# Patient Record
Sex: FEMALE | Race: ASIAN | Employment: OTHER | URBAN - METROPOLITAN AREA
[De-identification: names, ages, dates, MRNs, and addresses within clinical notes are randomized per-mention and may not be internally consistent; named-entity substitution may affect disease eponyms.]

---

## 2020-07-13 ENCOUNTER — ANCILLARY PROCEDURE (OUTPATIENT)
Dept: GENERAL RADIOLOGY | Facility: CLINIC | Age: 65
End: 2020-07-13
Attending: FAMILY MEDICINE
Payer: MEDICAID

## 2020-07-13 ENCOUNTER — OFFICE VISIT (OUTPATIENT)
Dept: FAMILY MEDICINE | Facility: CLINIC | Age: 65
End: 2020-07-13
Payer: MEDICAID

## 2020-07-13 VITALS
TEMPERATURE: 97.8 F | HEIGHT: 62 IN | DIASTOLIC BLOOD PRESSURE: 82 MMHG | HEART RATE: 60 BPM | OXYGEN SATURATION: 98 % | SYSTOLIC BLOOD PRESSURE: 118 MMHG | WEIGHT: 161 LBS | RESPIRATION RATE: 16 BRPM | BODY MASS INDEX: 29.63 KG/M2

## 2020-07-13 DIAGNOSIS — S62.501A: ICD-10-CM

## 2020-07-13 DIAGNOSIS — S69.91XA INJURY OF RIGHT THUMB, INITIAL ENCOUNTER: Primary | ICD-10-CM

## 2020-07-13 DIAGNOSIS — S69.91XA INJURY OF RIGHT THUMB, INITIAL ENCOUNTER: ICD-10-CM

## 2020-07-13 PROCEDURE — 99203 OFFICE O/P NEW LOW 30 MIN: CPT | Performed by: FAMILY MEDICINE

## 2020-07-13 PROCEDURE — T1013 SIGN LANG/ORAL INTERPRETER: HCPCS | Mod: U3 | Performed by: FAMILY MEDICINE

## 2020-07-13 PROCEDURE — 73140 X-RAY EXAM OF FINGER(S): CPT | Mod: RT

## 2020-07-13 SDOH — HEALTH STABILITY: MENTAL HEALTH: HOW OFTEN DO YOU HAVE A DRINK CONTAINING ALCOHOL?: NEVER

## 2020-07-13 ASSESSMENT — MIFFLIN-ST. JEOR: SCORE: 1238.54

## 2020-07-13 NOTE — PATIENT INSTRUCTIONS
Thank you for choosing [facility].  You may be receiving an email and/or telephone survey request from [facility] Customer Experience regarding your visit today.  Please take a few minutes to respond to the survey to let us know how we are doing.      If you have questions or concerns, please contact us via [employer] or you can contact your care team at [phone].    Our Clinic hours are:  Monday 6:40 am  to 7:00 pm  Tuesday -Friday 6:40 am to 5:00 pm    The [place] outpatient lab hours are:  Monday - Friday 6:10 am to 4:45 pm  Saturdays 7:00 am to 11:00 am  Appointments are required, call [phone]    If you have clinical questions after hours or would like to schedule an appointment,  call the clinic at [phone].

## 2020-07-13 NOTE — PROGRESS NOTES
Subjective     Giorgio Bryan is a 64 year old female who presents to clinic today for the following health issues:    Patient is a 64 yr old female with right thumb injury. She had a drill land on her thumb two weeks ago while she was helping to frame a shed. Since then she has had intense swelling and pain she has been unable to bend the thumb. No bleeding at the time of the injury.     HPI   Concern - injury R thumb   Onset: 2.5 weeks ago     Description:   Patient was helping with framing a shed and the drill fell on her thumb, it is swollen and painful, this happen  about 2.5 weeks ago and has not gotten better     Intensity: moderate constant pain , severe with movement     Progression of Symptoms:  same    Accompanying Signs & Symptoms:  A nail gun or hand drill fell on patient's thumb     Previous history of similar problem:   None     Precipitating factors:   Worsened by: with use     Alleviating factors:  Improved by: none     Therapies Tried and outcome: Patient has tried ice, advil, Ibuprofen and has tried using a splint      There is no problem list on file for this patient.    History reviewed. No pertinent surgical history.    Social History     Tobacco Use     Smoking status: Never Smoker     Smokeless tobacco: Never Used   Substance Use Topics     Alcohol use: Never     Frequency: Never     History reviewed. No pertinent family history.      No current outpatient medications on file.     No Known Allergies  BP Readings from Last 3 Encounters:   07/13/20 118/82    Wt Readings from Last 3 Encounters:   07/13/20 73 kg (161 lb)                      Reviewed and updated as needed this visit by Provider  Tobacco  Allergies  Meds  Problems  Med Hx  Surg Hx  Fam Hx         Review of Systems   Constitutional, HEENT, cardiovascular, pulmonary, gi and gu systems are negative, except as otherwise noted.      Objective    /82   Pulse 60   Temp 97.8  F (36.6  C) (Tympanic)   Resp 16   Ht 1.575 m (5'  "2\")   Wt 73 kg (161 lb)   SpO2 98%   BMI 29.45 kg/m    Body mass index is 29.45 kg/m .  Physical Exam   GENERAL: healthy, alert and no distress  MS: decreased range of motion of right thumb  and tenderness to palpation - swelling    Diagnostic Test Results:  Xray - IMPRESSION: There is a comminuted fracture of the mid diaphysis of the  proximal phalanx of the thumb with up to 2 mm ulnar displacement and  minimal dorsal angulation. Soft tissue swelling about the thumb.        Assessment & Plan       ICD-10-CM    1. Injury of right thumb, initial encounter  S69.91XA XR Finger Right G/E 2 Views   2. Closed avulsion fracture of right thumb, initial encounter  S62.501A Orthopedic & Spine  Referral   Patient referred to orthopedist.    FUTURE APPOINTMENTS:       - Follow-up visit in one month or sooner as needed.    Return in about 4 weeks (around 8/10/2020) for In-Clinic Visit.    Leodan Pulliam MD  [facility]      "

## 2020-07-14 ENCOUNTER — OFFICE VISIT (OUTPATIENT)
Dept: ORTHOPEDICS | Facility: CLINIC | Age: 65
End: 2020-07-14
Attending: FAMILY MEDICINE
Payer: MEDICAID

## 2020-07-14 VITALS — BODY MASS INDEX: 29.63 KG/M2 | WEIGHT: 161 LBS | HEIGHT: 62 IN

## 2020-07-14 DIAGNOSIS — S62.511A DISPLACED FRACTURE OF PROXIMAL PHALANX OF RIGHT THUMB, INITIAL ENCOUNTER FOR CLOSED FRACTURE: Primary | ICD-10-CM

## 2020-07-14 PROCEDURE — 99204 OFFICE O/P NEW MOD 45 MIN: CPT | Mod: 25 | Performed by: FAMILY MEDICINE

## 2020-07-14 PROCEDURE — 29075 APPL CST ELBW FNGR SHORT ARM: CPT | Mod: RT | Performed by: FAMILY MEDICINE

## 2020-07-14 PROCEDURE — T1013 SIGN LANG/ORAL INTERPRETER: HCPCS | Mod: U3 | Performed by: FAMILY MEDICINE

## 2020-07-14 ASSESSMENT — MIFFLIN-ST. JEOR: SCORE: 1238.54

## 2020-07-14 NOTE — PATIENT INSTRUCTIONS
Diagnosis: Right thumb fracture displaced  Image Findings: mildly angulated proximal thumb fracture  Treatment: thumb spica cast, may have to change in 1-2 weeks if it becomes too loose  Medications: Limited tylenol/ibuprofen for pain for 1-2 weeks  Follow-up: 1 week for repeat x-rays in cast    It was great seeing you today!    Dhiraj Dsouza    Patient Education     Fiberglass Cast Care    It may take up to 2 hours for the fiberglass to get completely hard. Don t put any weight on the cast during that time or it may break.  To prevent swelling under the cast, do the following for the first 2 days (48 hours):    If the cast is on your arm: Keep it in a sling or raised to shoulder level when you are sitting or standing. Rest it on your chest or on a pillow at your side when lying down. Keep the cast above the level of your heart.    If the cast is on your leg: Keep it propped up above the level of your hip when you are sitting or lying down. Sleep with the cast raised on pillows. Avoid crutch walking as much as possible during this time.  Keep the cast completely dry at all times. Bathe with your cast well out of the water. Protect it with a large plastic bag kept in place with rubber bands or tape. If your cast does get wet, use a hair dryer to warm the cast and speed up the drying process. A wet cast may cause skin problems.  Don t put creams or objects under the cast if you have itching.  Follow-up care  Follow up with your healthcare provider as advised.  When to seek medical advice  Call your healthcare provider right away if any of these occur:    The cast cracks    The cast and padding get wet and stay wet for more than a day (24 hours)    Bad odor from the cast or wound fluid stains the cast    Tightness or pressure under the cast gets worse    Fingers or toes become swollen, cold, blue, numb, or tingly    You can t move your toes or fingers    Pain under the cast gets worse or you feel a burning  sensation    Skin around the cast becomes red or irritated    Fever of 100.4 F (38 C) or higher, or chills as directed by your healthcare provider   Date Last Reviewed: 2/1/2017 2000-2019 [employer]. [address] [place] [address], [place] [address]. All rights reserved. This information is not intended as a substitute for professional medical care. Always follow your healthcare professional's instructions.

## 2020-07-14 NOTE — LETTER
7/14/2020         RE: Giorgio Bryan  [address]  [facility] [address]        Dear Colleague,    Thank you for referring your patient, Giorgio Bryan, to the [facility]. Please see a copy of my visit note below.    ASSESSMENT & PLAN  Giorgio was seen today for pain.    Diagnoses and all orders for this visit:    Displaced fracture of proximal phalanx of right thumb, initial encounter for closed fracture  -     Cast/splint application    Other orders  -     Orthopedic & Spine  Referral      Patient is a 64 year old female presenting for evaluation of   Chief Complaint   Patient presents with     Right Thumb - Pain     # Displaced right proximal thumb fracture: Noted after getting hit between a drill and a shed 2 and half weeks ago.  Patient still having pain and swelling of the proximal thumb.  X-ray showing a comminuted mildly displaced, dorsally angulated proximal phalanx fracture.  Given this plan to treat as below and follow-up in 1 week for repeat x-rays in cast.  Treatment: Thumb spica cast  Physical Therapy none, if not improved can refer for formal PT  Medications  Limited NSAIDs/Tylenol    Concerning signs/sx that would warrant urgent evaluation were discussed.  All questions were answered, patient understands and agrees with plan.      Return in about 1 week (around 7/21/2020).    -----    SUBJECTIVE  Giorgio Bryan is a/an 64 year old Right handed female who is seen in consultation at the request of  Leodan Pulliam M.D. for evaluation of right hand pain. The patient is seen with their son.    Onset: 2.5 week(s) ago. Patient describes injury as  Patient was helping with framing a shed and the drill fell on her thumb.   Location of Pain: right thumb-deep   Rating of Pain at worst: 7/10  Rating of Pain Currently: 5/10  Worsened by: movement, point tenderness   Better with: nothing   Treatments tried: ice, ibuprofen and splint   Associated symptoms: swelling,  "deep ache, bruising   Orthopedic history: NO  Relevant surgical history: NO  No past medical history on file.  Social History     Socioeconomic History     Marital status:      Spouse name: None     Number of children: None     Years of education: None     Highest education level: None   Occupational History     None   Social Needs     Financial resource strain: None     Food insecurity     Worry: None     Inability: None     Transportation needs     Medical: None     Non-medical: None   Tobacco Use     Smoking status: Never Smoker     Smokeless tobacco: Never Used   Substance and Sexual Activity     Alcohol use: Never     Frequency: Never     Drug use: Never     Sexual activity: Not Currently   Lifestyle     Physical activity     Days per week: None     Minutes per session: None     Stress: None   Relationships     Social connections     Talks on phone: None     Gets together: None     Attends Tenriism service: None     Active member of club or organization: None     Attends meetings of clubs or organizations: None     Relationship status: None     Intimate partner violence     Fear of current or ex partner: None     Emotionally abused: None     Physically abused: None     Forced sexual activity: None   Other Topics Concern     None   Social History Narrative     None     Patient's past medical, surgical, social, and family histories were reviewed today and no changes are noted.  No family history pertinent to the patient's problem today    REVIEW OF SYSTEMS:  10 point ROS is negative other than symptoms noted above in HPI, Past Medical History or as stated below  Constitutional: NEGATIVE for fever, chills, change in weight  Skin: NEGATIVE for worrisome rashes, moles or lesions  GI/: NEGATIVE for bowel or bladder changes  Neuro: NEGATIVE for weakness, dizziness or paresthesias    OBJECTIVE:  BP (P) 112/64   Ht 1.575 m (5' 2\")   Wt 73 kg (161 lb)   BMI 29.45 kg/m     General: healthy, alert and in no " distress  HEENT: no scleral icterus or conjunctival erythema  Skin: no suspicious lesions or rash. No jaundice.  CV: regular rhythm by palpation  Resp: normal respiratory effort without conversational dyspnea   Psych: normal mood and affect  Gait: normal steady gait with appropriate coordination and balance  Neuro: normal light touch sensory exam of the bilateral hands.    MSK:  RIGHT HAND  Inspection:   Right thumb swelling  Palpation:   Carpals: normal   Metacarpals: normal   Thumb: diffuse TTP worse over proximal phalanx   Fingers: normal  Range of Motion:  Unable to flex/extend thumb significantly 2/2 pain, FROM in other digits  Strength:    Pain limiting flex/ext with thumb,  strength intact  Special Tests:    Positive: none    Negative: flexor digitorum superficialis testing, flexor digitorum profundus testing    Independent visualization of the below image:  No results found for this or any previous visit (from the past 24 hour(s)).    Cast/splint application    Date/Time: 7/14/2020 10:26 AM  Performed by: Tran Shaw  Authorized by: Dhiraj Dsouza MD     Consent:     Consent obtained:  Verbal    Consent given by:  Patient    Risks discussed:  Discoloration, numbness, pain and swelling    Alternatives discussed:  Observation and referral  Pre-procedure details:     Sensation:  Normal  Procedure details:     Laterality:  Right    Location:  Finger    Finger:  R thumb    Cast type:  Thumb spica    Supplies:  Fiberglass  Post-procedure details:     Pain:  Improved    Sensation:  Normal    Patient tolerance of procedure:  Tolerated well, no immediate complications    Patient provided with cast or splint care instructions: Yes         Dhiraj Dsouza MD [facility]        Again, thank you for allowing me to participate in the care of your patient.        Sincerely,        Dhiraj Dsouza MD

## 2020-07-14 NOTE — PROGRESS NOTES
ASSESSMENT & PLAN  Giorgio was seen today for pain.    Diagnoses and all orders for this visit:    Displaced fracture of proximal phalanx of right thumb, initial encounter for closed fracture  -     Cast/splint application    Other orders  -     Orthopedic & Spine  Referral      Patient is a 64 year old female presenting for evaluation of   Chief Complaint   Patient presents with     Right Thumb - Pain     # Displaced right proximal thumb fracture: Noted after getting hit between a drill and a shed 2 and half weeks ago.  Patient still having pain and swelling of the proximal thumb.  X-ray showing a comminuted mildly displaced, dorsally angulated proximal phalanx fracture.  Given this plan to treat as below and follow-up in 1 week for repeat x-rays in cast.  Treatment: Thumb spica cast  Physical Therapy none, if not improved can refer for formal PT  Medications  Limited NSAIDs/Tylenol    Concerning signs/sx that would warrant urgent evaluation were discussed.  All questions were answered, patient understands and agrees with plan.      Return in about 1 week (around 7/21/2020).    -----    SUBJECTIVE  Giorgio Bryan is a/an 64 year old Right handed female who is seen in consultation at the request of  Leodan Pulliam M.D. for evaluation of right hand pain. The patient is seen with their son.    Onset: 2.5 week(s) ago. Patient describes injury as  Patient was helping with framing a shed and the drill fell on her thumb.   Location of Pain: right thumb-deep   Rating of Pain at worst: 7/10  Rating of Pain Currently: 5/10  Worsened by: movement, point tenderness   Better with: nothing   Treatments tried: ice, ibuprofen and splint   Associated symptoms: swelling, deep ache, bruising   Orthopedic history: NO  Relevant surgical history: NO  No past medical history on file.  Social History     Socioeconomic History     Marital status:      Spouse name: None     Number of children: None     Years of education:  "None     Highest education level: None   Occupational History     None   Social Needs     Financial resource strain: None     Food insecurity     Worry: None     Inability: None     Transportation needs     Medical: None     Non-medical: None   Tobacco Use     Smoking status: Never Smoker     Smokeless tobacco: Never Used   Substance and Sexual Activity     Alcohol use: Never     Frequency: Never     Drug use: Never     Sexual activity: Not Currently   Lifestyle     Physical activity     Days per week: None     Minutes per session: None     Stress: None   Relationships     Social connections     Talks on phone: None     Gets together: None     Attends Sabianism service: None     Active member of club or organization: None     Attends meetings of clubs or organizations: None     Relationship status: None     Intimate partner violence     Fear of current or ex partner: None     Emotionally abused: None     Physically abused: None     Forced sexual activity: None   Other Topics Concern     None   Social History Narrative     None     Patient's past medical, surgical, social, and family histories were reviewed today and no changes are noted.  No family history pertinent to the patient's problem today    REVIEW OF SYSTEMS:  10 point ROS is negative other than symptoms noted above in HPI, Past Medical History or as stated below  Constitutional: NEGATIVE for fever, chills, change in weight  Skin: NEGATIVE for worrisome rashes, moles or lesions  GI/: NEGATIVE for bowel or bladder changes  Neuro: NEGATIVE for weakness, dizziness or paresthesias    OBJECTIVE:  BP (P) 112/64   Ht 1.575 m (5' 2\")   Wt 73 kg (161 lb)   BMI 29.45 kg/m     General: healthy, alert and in no distress  HEENT: no scleral icterus or conjunctival erythema  Skin: no suspicious lesions or rash. No jaundice.  CV: regular rhythm by palpation  Resp: normal respiratory effort without conversational dyspnea   Psych: normal mood and affect  Gait: normal " steady gait with appropriate coordination and balance  Neuro: normal light touch sensory exam of the bilateral hands.    MSK:  RIGHT HAND  Inspection:   Right thumb swelling  Palpation:   Carpals: normal   Metacarpals: normal   Thumb: diffuse TTP worse over proximal phalanx   Fingers: normal  Range of Motion:  Unable to flex/extend thumb significantly 2/2 pain, FROM in other digits  Strength:    Pain limiting flex/ext with thumb,  strength intact  Special Tests:    Positive: none    Negative: flexor digitorum superficialis testing, flexor digitorum profundus testing    Independent visualization of the below image:  No results found for this or any previous visit (from the past 24 hour(s)).    Cast/splint application    Date/Time: 7/14/2020 10:26 AM  Performed by: Tran Shaw  Authorized by: Dhiraj Dsouza MD     Consent:     Consent obtained:  Verbal    Consent given by:  Patient    Risks discussed:  Discoloration, numbness, pain and swelling    Alternatives discussed:  Observation and referral  Pre-procedure details:     Sensation:  Normal  Procedure details:     Laterality:  Right    Location:  Finger    Finger:  R thumb    Cast type:  Thumb spica    Supplies:  Fiberglass  Post-procedure details:     Pain:  Improved    Sensation:  Normal    Patient tolerance of procedure:  Tolerated well, no immediate complications    Patient provided with cast or splint care instructions: Yes         Dhiraj Dsouza MD [facility]

## 2020-07-24 ENCOUNTER — ANCILLARY PROCEDURE (OUTPATIENT)
Dept: GENERAL RADIOLOGY | Facility: CLINIC | Age: 65
End: 2020-07-24
Attending: FAMILY MEDICINE
Payer: MEDICAID

## 2020-07-24 ENCOUNTER — OFFICE VISIT (OUTPATIENT)
Dept: ORTHOPEDICS | Facility: CLINIC | Age: 65
End: 2020-07-24
Payer: MEDICAID

## 2020-07-24 VITALS — SYSTOLIC BLOOD PRESSURE: 126 MMHG | DIASTOLIC BLOOD PRESSURE: 82 MMHG

## 2020-07-24 DIAGNOSIS — S62.511A DISPLACED FRACTURE OF PROXIMAL PHALANX OF RIGHT THUMB, INITIAL ENCOUNTER FOR CLOSED FRACTURE: ICD-10-CM

## 2020-07-24 DIAGNOSIS — S62.511A DISPLACED FRACTURE OF PROXIMAL PHALANX OF RIGHT THUMB, INITIAL ENCOUNTER FOR CLOSED FRACTURE: Primary | ICD-10-CM

## 2020-07-24 PROCEDURE — T1013 SIGN LANG/ORAL INTERPRETER: HCPCS | Mod: U3 | Performed by: FAMILY MEDICINE

## 2020-07-24 PROCEDURE — 73140 X-RAY EXAM OF FINGER(S): CPT | Mod: RT

## 2020-07-24 PROCEDURE — 99213 OFFICE O/P EST LOW 20 MIN: CPT | Performed by: FAMILY MEDICINE

## 2020-07-24 NOTE — PATIENT INSTRUCTIONS
Diagnosis: Right Thumb Fracture  Image Findings: Stable mildly displaced proximal thumb fracture  Treatment: Continue cast  Medications: Limited tylenol/ibuprofen for pain for 1-2 weeks  Follow-up: 2 weeks for repeat evaluation and x-rays out of cast    It was great seeing you again today!    Dhiraj Dsouza

## 2020-07-24 NOTE — PROGRESS NOTES
ASSESSMENT & PLAN  Giorgio was seen today for follow up.    Diagnoses and all orders for this visit:    Displaced fracture of proximal phalanx of right thumb, initial encounter for closed fracture  -     XR Finger Right G/E 2 Views; Future      Patient is a 64 year old female presenting for evaluation of   Chief Complaint   Patient presents with     Right Thumb - Follow Up     # Displaced right proximal thumb fracture: Noted after getting hit between a drill and a shed approx 7/1 with initial X-ray showing a comminuted mildly displaced, dorsally angulated proximal phalanx fracture.  Pain improving today while in cast.  Repeat x-ray today showing stable alignment of mildly displaced first digit proximal phalanx fracture.  Given this plan to treat as below and follow-up in 2 weeks for repeat x-rays out of cast.  Treatment: Thumb spica cast continue  Physical Therapy none, if not improved can refer for formal PT  Medications  Limited NSAIDs/Tylenol    Concerning signs/sx that would warrant urgent evaluation were discussed.  All questions were answered, patient understands and agrees with plan.      Return in about 2 weeks (around 8/7/2020).    -----    SUBJECTIVE  Giorgio CAMILLE Bryan is a/an 64 year old Right handed female who is seen in consultation at the request of  Leodan Pulliam M.D. for evaluation of right hand pain. The patient is seen with their son.    Onset: 2.5 week(s) ago. Patient describes injury as  Patient was helping with framing a shed and the drill fell on her thumb.   Location of Pain: right thumb-deep   Rating of Pain at worst: 7/10  Rating of Pain Currently: 5/10  Worsened by: movement, point tenderness   Better with: nothing   Treatments tried: ice, ibuprofen and splint   Associated symptoms: swelling, deep ache, bruising   Orthopedic history: NO  Relevant surgical history: NO    Interim History - July 24, 2020  Since last visit on 7/14/2020 patient has improved pain well in the thumb spica cast.   No worsening of symptoms.  No interim injury.  Patient is here with her son  .  Native  helped complete the visit over the phone.    History reviewed. No pertinent past medical history.  Social History     Socioeconomic History     Marital status:      Spouse name: None     Number of children: None     Years of education: None     Highest education level: None   Occupational History     None   Social Needs     Financial resource strain: None     Food insecurity     Worry: None     Inability: None     Transportation needs     Medical: None     Non-medical: None   Tobacco Use     Smoking status: Never Smoker     Smokeless tobacco: Never Used   Substance and Sexual Activity     Alcohol use: Never     Frequency: Never     Drug use: Never     Sexual activity: Not Currently   Lifestyle     Physical activity     Days per week: None     Minutes per session: None     Stress: None   Relationships     Social connections     Talks on phone: None     Gets together: None     Attends Moravian service: None     Active member of club or organization: None     Attends meetings of clubs or organizations: None     Relationship status: None     Intimate partner violence     Fear of current or ex partner: None     Emotionally abused: None     Physically abused: None     Forced sexual activity: None   Other Topics Concern     None   Social History Narrative     None     Patient's past medical, surgical, social, and family histories were reviewed today and no changes are noted.  No family history pertinent to the patient's problem today    REVIEW OF SYSTEMS:  10 point ROS is negative other than symptoms noted above in HPI, Past Medical History or as stated below  Constitutional: NEGATIVE for fever, chills, change in weight  Skin: NEGATIVE for worrisome rashes, moles or lesions  GI/: NEGATIVE for bowel or bladder changes  Neuro: NEGATIVE for weakness, dizziness or paresthesias    OBJECTIVE:  /82     General: healthy, alert and in no distress  HEENT: no scleral icterus or conjunctival erythema  Skin: no suspicious lesions or rash. No jaundice.  CV: regular rhythm by palpation  Resp: normal respiratory effort without conversational dyspnea   Psych: normal mood and affect  Gait: normal steady gait with appropriate coordination and balance  Neuro: normal light touch sensory exam of the bilateral hands.    MSK:  RIGHT HAND  Inspection:  Clean appearing thumb spica cast  Palpation: No pain over the thumb while in cast      Range of Motion:  Range of motion not assessed due to patient being in cast  Strength:  Intact thumb flexion and extension with no pain  Special Tests:    Positive: none    Negative: flexor digitorum superficialis testing, flexor digitorum profundus testing    Independent visualization of the below image:  Recent Results (from the past 24 hour(s))   XR Finger Right G/E 2 Views    Narrative    XR FINGER RT G/E 2 VW 7/24/2020 9:12 AM     HISTORY: Displaced fracture of proximal phalanx of right thumb,  initial encounter for closed fracture    COMPARISON: 7/13/2020      Impression    IMPRESSION: Bone detail is obscured by cast material. Right thumb  proximal phalangeal comminuted fracture is again noted, the alignment  similar if not unchanged.    ISRAEL SAMUEL MD     I  ordered, visualized and reviewed these images with the patient  Stable appearing mildly displaced/angulated first proximal phalanx fracture similar appearance to previous x-ray      Dhiraj Dsouza MD [facility]

## 2020-07-24 NOTE — LETTER
7/24/2020         RE: Giorgio Bryan  [address]  [place] [address]        Dear Colleague,    Thank you for referring your patient, Giorgio Bryan, to the [facility]. Please see a copy of my visit note below.    ASSESSMENT & PLAN  Giorgio was seen today for follow up.    Diagnoses and all orders for this visit:    Displaced fracture of proximal phalanx of right thumb, initial encounter for closed fracture  -     XR Finger Right G/E 2 Views; Future      Patient is a 64 year old female presenting for evaluation of   Chief Complaint   Patient presents with     Right Thumb - Follow Up     # Displaced right proximal thumb fracture: Noted after getting hit between a drill and a shed approx 7/1 with initial X-ray showing a comminuted mildly displaced, dorsally angulated proximal phalanx fracture.  Pain improving today while in cast.  Repeat x-ray today showing stable alignment of mildly displaced first digit proximal phalanx fracture.  Given this plan to treat as below and follow-up in 2 weeks for repeat x-rays out of cast.  Treatment: Thumb spica cast continue  Physical Therapy none, if not improved can refer for formal PT  Medications  Limited NSAIDs/Tylenol    Concerning signs/sx that would warrant urgent evaluation were discussed.  All questions were answered, patient understands and agrees with plan.      Return in about 2 weeks (around 8/7/2020).    -----    SUBJECTIVE  Giorgio Bryan is a/an 64 year old Right handed female who is seen in consultation at the request of  Leodan Pulliam M.D. for evaluation of right hand pain. The patient is seen with their son.    Onset: 2.5 week(s) ago. Patient describes injury as  Patient was helping with framing a shed and the drill fell on her thumb.   Location of Pain: right thumb-deep   Rating of Pain at worst: 7/10  Rating of Pain Currently: 5/10  Worsened by: movement, point tenderness   Better with: nothing   Treatments tried: ice,  ibuprofen and splint   Associated symptoms: swelling, deep ache, bruising   Orthopedic history: NO  Relevant surgical history: NO    Interim History - July 24, 2020  Since last visit on 7/14/2020 patient has improved pain well in the thumb spica cast.  No worsening of symptoms.  No interim injury.  Patient is here with her son  .  Native  helped complete the visit over the phone.    History reviewed. No pertinent past medical history.  Social History     Socioeconomic History     Marital status:      Spouse name: None     Number of children: None     Years of education: None     Highest education level: None   Occupational History     None   Social Needs     Financial resource strain: None     Food insecurity     Worry: None     Inability: None     Transportation needs     Medical: None     Non-medical: None   Tobacco Use     Smoking status: Never Smoker     Smokeless tobacco: Never Used   Substance and Sexual Activity     Alcohol use: Never     Frequency: Never     Drug use: Never     Sexual activity: Not Currently   Lifestyle     Physical activity     Days per week: None     Minutes per session: None     Stress: None   Relationships     Social connections     Talks on phone: None     Gets together: None     Attends Tenri[employer] service: None     Active member of club or organization: None     Attends meetings of clubs or organizations: None     Relationship status: None     Intimate partner violence     Fear of current or ex partner: None     Emotionally abused: None     Physically abused: None     Forced sexual activity: None   Other Topics Concern     None   Social History Narrative     None     Patient's past medical, surgical, social, and family histories were reviewed today and no changes are noted.  No family history pertinent to the patient's problem today    REVIEW OF SYSTEMS:  10 point ROS is negative other than symptoms noted above in HPI, Past Medical History or as stated  below  Constitutional: NEGATIVE for fever, chills, change in weight  Skin: NEGATIVE for worrisome rashes, moles or lesions  GI/: NEGATIVE for bowel or bladder changes  Neuro: NEGATIVE for weakness, dizziness or paresthesias    OBJECTIVE:  /82    General: healthy, alert and in no distress  HEENT: no scleral icterus or conjunctival erythema  Skin: no suspicious lesions or rash. No jaundice.  CV: regular rhythm by palpation  Resp: normal respiratory effort without conversational dyspnea   Psych: normal mood and affect  Gait: normal steady gait with appropriate coordination and balance  Neuro: normal light touch sensory exam of the bilateral hands.    MSK:  RIGHT HAND  Inspection:  Clean appearing thumb spica cast  Palpation: No pain over the thumb while in cast      Range of Motion:  Range of motion not assessed due to patient being in cast  Strength:  Intact thumb flexion and extension with no pain  Special Tests:    Positive: none    Negative: flexor digitorum superficialis testing, flexor digitorum profundus testing    Independent visualization of the below image:  Recent Results (from the past 24 hour(s))   XR Finger Right G/E 2 Views    Narrative    XR FINGER RT G/E 2 VW 7/24/2020 9:12 AM     HISTORY: Displaced fracture of proximal phalanx of right thumb,  initial encounter for closed fracture    COMPARISON: 7/13/2020      Impression    IMPRESSION: Bone detail is obscured by cast material. Right thumb  proximal phalangeal comminuted fracture is again noted, the alignment  similar if not unchanged.    ISRAEL SAMUEL MD     I  ordered, visualized and reviewed these images with the patient  Stable appearing mildly displaced/angulated first proximal phalanx fracture similar appearance to previous x-ray      Dhiraj Dsouza MD [facility]        Again, thank you for allowing me to participate in the care of your patient.        Sincerely,        Dhiraj Dsouza MD

## 2020-08-10 ENCOUNTER — ANCILLARY PROCEDURE (OUTPATIENT)
Dept: GENERAL RADIOLOGY | Facility: CLINIC | Age: 65
End: 2020-08-10
Attending: FAMILY MEDICINE
Payer: COMMERCIAL

## 2020-08-10 ENCOUNTER — OFFICE VISIT (OUTPATIENT)
Dept: ORTHOPEDICS | Facility: CLINIC | Age: 65
End: 2020-08-10
Payer: COMMERCIAL

## 2020-08-10 VITALS
DIASTOLIC BLOOD PRESSURE: 70 MMHG | BODY MASS INDEX: 29.63 KG/M2 | WEIGHT: 161 LBS | HEIGHT: 62 IN | SYSTOLIC BLOOD PRESSURE: 118 MMHG

## 2020-08-10 DIAGNOSIS — S62.511D CLOSED DISPLACED FRACTURE OF PROXIMAL PHALANX OF RIGHT THUMB WITH ROUTINE HEALING, SUBSEQUENT ENCOUNTER: Primary | ICD-10-CM

## 2020-08-10 DIAGNOSIS — S62.511A DISPLACED FRACTURE OF PROXIMAL PHALANX OF RIGHT THUMB, INITIAL ENCOUNTER FOR CLOSED FRACTURE: ICD-10-CM

## 2020-08-10 PROCEDURE — 99213 OFFICE O/P EST LOW 20 MIN: CPT | Performed by: FAMILY MEDICINE

## 2020-08-10 PROCEDURE — 73140 X-RAY EXAM OF FINGER(S): CPT | Mod: RT

## 2020-08-10 ASSESSMENT — MIFFLIN-ST. JEOR: SCORE: 1238.54

## 2020-08-10 NOTE — LETTER
8/10/2020         RE: Giorgio Bryan  [address]  [facility] [address]        Dear Colleague,    Thank you for referring your patient, Giorgio Bryan, to the [facility]. Please see a copy of my visit note below.    ASSESSMENT & PLAN  Giorgio was seen today for pain.    Diagnoses and all orders for this visit:    Closed displaced fracture of proximal phalanx of right thumb with routine healing, subsequent encounter  -     XR Finger Right G/E 2 Views; Future  -     HAND THERAPY Occupational Therapy or Physical Therapy; Future      Patient is a 64 year old female presenting for evaluation of   Chief Complaint   Patient presents with     Right Thumb - Pain     # Displaced right proximal thumb fracture: Noted after getting hit between a drill and a shed approx 7/1 with initial X-ray showing a comminuted mildly displaced, dorsally angulated proximal phalanx fracture.  Pain resolved today while in cast, very mild TTP over volar DIP joint of thumb with limited ROM 2/2 swelling/stiffness.  Repeat x-ray today showing stable alignment of mildly displaced first digit proximal phalanx fracture with evidence of healing.  Given this plan to treat as below and follow-up in 4 weeks as needed.  Pt and son understand and agree with plan.   Treatment: Transition out of cast, thumb spica brace for the next 1-2 weeks, start ROM at home  Physical Therapy Referred to hand therapy today  Medications  Limited NSAIDs/Tylenol    Concerning signs/sx that would warrant urgent evaluation were discussed.  All questions were answered, patient understands and agrees with plan.      Return in about 1 month (around 9/10/2020).    -----    SUBJECTIVE  Giorgio Bryan is a/an 64 year old Right handed female who is seen in consultation at the request of  Leodan Pulliam M.D. for evaluation of right hand pain. The patient is seen with their son.    Onset: 2.5 week(s) ago. Patient describes injury as  Patient was  helping with framing a shed and the drill fell on her thumb.   Location of Pain: right thumb-deep   Rating of Pain at worst: 7/10  Rating of Pain Currently: 5/10  Worsened by: movement, point tenderness   Better with: nothing   Treatments tried: ice, ibuprofen and splint   Associated symptoms: swelling, deep ache, bruising   Orthopedic history: NO  Relevant surgical history: NO    Interim History - July 24, 2020  Since last visit on 7/14/2020 patient has improved pain well in the thumb spica cast.  No worsening of symptoms.  No interim injury.  Patient is here with her son  .  Native  helped complete the visit over the phone.    Interim History - August 10, 2020  Since last visit on 7/24/2020 patient has improved pain.  Patient is 6.5 weeks out from injury. No interim injury.   Native  helped complete the visit over the phone.    History reviewed. No pertinent past medical history.  Social History     Socioeconomic History     Marital status:      Spouse name: None     Number of children: None     Years of education: None     Highest education level: None   Occupational History     None   Social Needs     Financial resource strain: None     Food insecurity     Worry: None     Inability: None     Transportation needs     Medical: None     Non-medical: None   Tobacco Use     Smoking status: Never Smoker     Smokeless tobacco: Never Used   Substance and Sexual Activity     Alcohol use: Never     Frequency: Never     Drug use: Never     Sexual activity: Not Currently   Lifestyle     Physical activity     Days per week: None     Minutes per session: None     Stress: None   Relationships     Social connections     Talks on phone: None     Gets together: None     Attends Shinto service: None     Active member of club or organization: None     Attends meetings of clubs or organizations: None     Relationship status: None     Intimate partner violence     Fear of current or  "ex partner: None     Emotionally abused: None     Physically abused: None     Forced sexual activity: None   Other Topics Concern     None   Social History Narrative     None     Patient's past medical, surgical, social, and family histories were reviewed today and no changes are noted.  No family history pertinent to the patient's problem today    REVIEW OF SYSTEMS:  10 point ROS is negative other than symptoms noted above in HPI, Past Medical History or as stated below  Constitutional: NEGATIVE for fever, chills, change in weight  Skin: NEGATIVE for worrisome rashes, moles or lesions  GI/: NEGATIVE for bowel or bladder changes  Neuro: NEGATIVE for weakness, dizziness or paresthesias    OBJECTIVE:  /70   Ht 1.575 m (5' 2\")   Wt 73 kg (161 lb)   BMI 29.45 kg/m     General: healthy, alert and in no distress  HEENT: no scleral icterus or conjunctival erythema  Skin: no suspicious lesions or rash. No jaundice.  CV: regular rhythm by palpation  Resp: normal respiratory effort without conversational dyspnea   Psych: normal mood and affect  Gait: normal steady gait with appropriate coordination and balance  Neuro: normal light touch sensory exam of the bilateral hands.    MSK:  RIGHT HAND  Inspection:  Swelling over 1st DIP joint stable appearing  Palpation: Mild TTP over volar DIP joint otherwise no TTP over fx site      Range of Motion:  Very limited 1st DIP flex/extension  Strength:  Intact thumb flexion and extension with no pain  Special Tests:    Positive: none    Negative: flexor digitorum superficialis testing, flexor digitorum profundus testing    Independent visualization of the below image:  Recent Results (from the past 24 hour(s))   XR Finger Right G/E 2 Views    Narrative    XR FINGER RT G/E 2 VW 8/10/2020 9:36 AM     HISTORY: Displaced fracture of proximal phalanx of right thumb,  initial encounter for closed fracture      Impression    IMPRESSION: Thumb proximal phalangeal fracture with apex " volar  angulation, the alignment unchanged following cast removal compared to  7/24/2020. Callus formation is present at the fracture site.    ISRAEL SAMUEL MD     I  ordered, visualized and reviewed these images with the patient  Stable appearing mildly displaced/angulated first proximal phalanx fracture similar appearance to previous x-ray with evidence of healing       Dhiraj Dsouza MD [facility]      Again, thank you for allowing me to participate in the care of your patient.        Sincerely,        Dhiraj Dsouza MD

## 2020-08-10 NOTE — PATIENT INSTRUCTIONS
Diagnosis: Right displaced thumb fracture  Image Findings: evidence of healing   Treatment: brace over the next 1-2 weeks, can be out of it at home to work on range of motion  Refer to hand therapy  Medications: none  Follow-up: 3-4 weeks if not improved, sooner if pain is worsening    Please call [phone]   Ask for my team if you have any questions or concerns    It was great seeing you again today!    Dhiraj Dsouza        Finger Exerciser & Hand Strengthener   Type:Medium Tension

## 2020-08-10 NOTE — PROGRESS NOTES
ASSESSMENT & PLAN  Giorgio was seen today for pain.    Diagnoses and all orders for this visit:    Closed displaced fracture of proximal phalanx of right thumb with routine healing, subsequent encounter  -     XR Finger Right G/E 2 Views; Future  -     HAND THERAPY Occupational Therapy or Physical Therapy; Future      Patient is a 64 year old female presenting for evaluation of   Chief Complaint   Patient presents with     Right Thumb - Pain     # Displaced right proximal thumb fracture: Noted after getting hit between a drill and a shed approx 7/1 with initial X-ray showing a comminuted mildly displaced, dorsally angulated proximal phalanx fracture.  Pain resolved today while in cast, very mild TTP over volar DIP joint of thumb with limited ROM 2/2 swelling/stiffness.  Repeat x-ray today showing stable alignment of mildly displaced first digit proximal phalanx fracture with evidence of healing.  Given this plan to treat as below and follow-up in 4 weeks as needed.  Pt and son understand and agree with plan.   Treatment: Transition out of cast, thumb spica brace for the next 1-2 weeks, start ROM at home  Physical Therapy Referred to hand therapy today  Medications  Limited NSAIDs/Tylenol    Concerning signs/sx that would warrant urgent evaluation were discussed.  All questions were answered, patient understands and agrees with plan.      Return in about 1 month (around 9/10/2020).    -----    SUBJECTIVE  Giorgio Bryan is a/an 64 year old Right handed female who is seen in consultation at the request of  Leodan Pulliam M.D. for evaluation of right hand pain. The patient is seen with their son.    Onset: 2.5 week(s) ago. Patient describes injury as  Patient was helping with framing a shed and the drill fell on her thumb.   Location of Pain: right thumb-deep   Rating of Pain at worst: 7/10  Rating of Pain Currently: 5/10  Worsened by: movement, point tenderness   Better with: nothing   Treatments tried: ice,  ibuprofen and splint   Associated symptoms: swelling, deep ache, bruising   Orthopedic history: NO  Relevant surgical history: NO    Interim History - July 24, 2020  Since last visit on 7/14/2020 patient has improved pain well in the thumb spica cast.  No worsening of symptoms.  No interim injury.  Patient is here with her son  .  Native  helped complete the visit over the phone.    Interim History - August 10, 2020  Since last visit on 7/24/2020 patient has improved pain.  Patient is 6.5 weeks out from injury. No interim injury.   Native  helped complete the visit over the phone.    History reviewed. No pertinent past medical history.  Social History     Socioeconomic History     Marital status:      Spouse name: None     Number of children: None     Years of education: None     Highest education level: None   Occupational History     None   Social Needs     Financial resource strain: None     Food insecurity     Worry: None     Inability: None     Transportation needs     Medical: None     Non-medical: None   Tobacco Use     Smoking status: Never Smoker     Smokeless tobacco: Never Used   Substance and Sexual Activity     Alcohol use: Never     Frequency: Never     Drug use: Never     Sexual activity: Not Currently   Lifestyle     Physical activity     Days per week: None     Minutes per session: None     Stress: None   Relationships     Social connections     Talks on phone: None     Gets together: None     Attends Presyb[employer]ian service: None     Active member of club or organization: None     Attends meetings of clubs or organizations: None     Relationship status: None     Intimate partner violence     Fear of current or ex partner: None     Emotionally abused: None     Physically abused: None     Forced sexual activity: None   Other Topics Concern     None   Social History Narrative     None     Patient's past medical, surgical, social, and family histories were  "reviewed today and no changes are noted.  No family history pertinent to the patient's problem today    REVIEW OF SYSTEMS:  10 point ROS is negative other than symptoms noted above in HPI, Past Medical History or as stated below  Constitutional: NEGATIVE for fever, chills, change in weight  Skin: NEGATIVE for worrisome rashes, moles or lesions  GI/: NEGATIVE for bowel or bladder changes  Neuro: NEGATIVE for weakness, dizziness or paresthesias    OBJECTIVE:  /70   Ht 1.575 m (5' 2\")   Wt 73 kg (161 lb)   BMI 29.45 kg/m     General: healthy, alert and in no distress  HEENT: no scleral icterus or conjunctival erythema  Skin: no suspicious lesions or rash. No jaundice.  CV: regular rhythm by palpation  Resp: normal respiratory effort without conversational dyspnea   Psych: normal mood and affect  Gait: normal steady gait with appropriate coordination and balance  Neuro: normal light touch sensory exam of the bilateral hands.    MSK:  RIGHT HAND  Inspection:  Swelling over 1st DIP joint stable appearing  Palpation: Mild TTP over volar DIP joint otherwise no TTP over fx site      Range of Motion:  Very limited 1st DIP flex/extension  Strength:  Intact thumb flexion and extension with no pain  Special Tests:    Positive: none    Negative: flexor digitorum superficialis testing, flexor digitorum profundus testing    Independent visualization of the below image:  Recent Results (from the past 24 hour(s))   XR Finger Right G/E 2 Views    Narrative    XR FINGER RT G/E 2 VW 8/10/2020 9:36 AM     HISTORY: Displaced fracture of proximal phalanx of right thumb,  initial encounter for closed fracture      Impression    IMPRESSION: Thumb proximal phalangeal fracture with apex volar  angulation, the alignment unchanged following cast removal compared to  7/24/2020. Callus formation is present at the fracture site.    ISRAEL SAMUEL MD     I  ordered, visualized and reviewed these images with the patient  Stable appearing " mildly displaced/angulated first proximal phalanx fracture similar appearance to previous x-ray with evidence of healing       Dhiraj Dsouza MD [facility]

## 2020-08-17 ENCOUNTER — HOSPITAL ENCOUNTER (OUTPATIENT)
Dept: OCCUPATIONAL THERAPY | Facility: CLINIC | Age: 65
Setting detail: THERAPIES SERIES
End: 2020-08-17
Attending: FAMILY MEDICINE
Payer: COMMERCIAL

## 2020-08-17 DIAGNOSIS — S62.511D CLOSED DISPLACED FRACTURE OF PROXIMAL PHALANX OF RIGHT THUMB WITH ROUTINE HEALING, SUBSEQUENT ENCOUNTER: ICD-10-CM

## 2020-08-17 PROCEDURE — 97140 MANUAL THERAPY 1/> REGIONS: CPT | Mod: GO | Performed by: OCCUPATIONAL THERAPIST

## 2020-08-17 PROCEDURE — 97110 THERAPEUTIC EXERCISES: CPT | Mod: GO | Performed by: OCCUPATIONAL THERAPIST

## 2020-08-17 PROCEDURE — 97165 OT EVAL LOW COMPLEX 30 MIN: CPT | Mod: GO | Performed by: OCCUPATIONAL THERAPIST

## 2020-08-17 PROCEDURE — 97022 WHIRLPOOL THERAPY: CPT | Mod: GO | Performed by: OCCUPATIONAL THERAPIST

## 2020-08-17 NOTE — PROGRESS NOTES
[facility]      OUTPATIENT OCCUPATIONAL THERAPY HAND EVALUATION  PLAN OF TREATMENT FOR OUTPATIENT REHABILITATION  (COMPLETE FOR INITIAL CLAIMS ONLY)  Patient's Last Name, First Name, M.I.  YOB: 1955  Giorgio Bryan                        Provider s Name: [facility] Medical Record No.  [id number]     Onset Date: 07/01/20    Start of Care Date: 08/17/20   Type:     ___PT  _X_OT   ___SLP    Medical Diagnosis: Closed displaced fracture of proximal phalanx of right thumb with routine healing   Occupational Therapy Diagnosis:  Decreased ADL's IADL's    Visits from SOC: 1      _________________________________________________________________________________  Plan of Treatment/Functional Goals:  Planned Therapy Interventions:  Ultrasound, Light Therapy, Fluidotherapy, Strengthening, ROM, Stretching, Manual Therapy, Edema Management, Self Care/Home Management, Home Program     Goals  1.  Goal Identifier: needle point       Goal Description: Patient increase IP flexion by 20 degrees so she can resume needle point       Target Date: 09/17/20   2. Goal Identifier: pinch       Goal Description: Patient to be able to pinch and open a ziplock style bag with min difficulty       Target Date: 09/24/20                Treatment Frequency: Therapy Frequency: 1x/week  Predicated Duration of Therapy Intervention:  Predicted Duration of Therapy Intervention (days/wks): 6 weeks    Supriya Hope OTR/L CHT  Occupational Therapist, Certified Hand Therapist         I CERTIFY THE NEED FOR THESE SERVICES FURNISHED UNDER        THIS PLAN OF TREATMENT AND WHILE UNDER MY CARE     (Physician co-signature of this document indicates review and certification of the therapy plan).                Certification Period:  08/17/20 to 10/17/20            Referring Physician:  Dr. Dsouza    Initial Assessment        See  Epic Evaluation Start of Care Date: 08/17/20

## 2020-08-17 NOTE — PROGRESS NOTES
08/17/20   Hand Therapy Evaluation   Quick Adds   Quick Adds Certification   Therapy Certification   Certification date from 08/17/20   Certification date to 10/17/20   Medical Diagnosis Closed displaced fracture of proximal phalanx of right thumb with routine healing   Certification I certify the need for these services furnished under this plan of treatment and while under my care.  (Physician co-signature of this document indicates review and certification of the therapy plan).   General Information/History   Start Of Care Date 08/17/20   Referring Physician Dr. Dsouza   Orders Evaluate And Treat As Indicated   Orders Date 08/10/20   Medical Diagnosis Closed displaced fracture of proximal phalanx of right thumb with routine healing   Additional Occupational Profile Info/Pertinent history of current problem Patient reports she had metal from her house hit her thumb causing break   Previous treatment or current condition cast- brace   Past medical history none listed see chart   How/Where did it occur During contact with an object;At home   Onset date of current episode/exacerbation 07/01/20   Chronicity New   Hand Dominance Right   Affected side Right   Functional limitations perform activities of daily living;perform desired leisure / sports activities   Reported Symptoms Pain;Numbness   Important Activities needlework   Patient role/Employment history Retired   Patient/Family goals statement to be able to use like she did prior   Fall Risk Screen   Fall screen completed by OT   Have you fallen 2 or more times in the past year? No   Have you fallen and had an injury in the past year? No   Is patient a fall risk? No   Abuse Screen (yes response referral indicated)   Feels Unsafe at Home or Work/School no   Feels Threatened by Someone no   Does Anyone Try to Keep You From Having Contact with Others or Doing Things Outside Your Home? no   Physical Signs of Abuse Present no   Pain   Pain Primary Pain Report    Primary Pain Report   Location volar and dorsal thumb   Pain Quality   (deep and numb)   Frequency Intermittent   Scale 0/10;3/10   Pain Is Worse At Night   Pain Is Exacerbated By Carrying   Pain Is Relieved By Rest   Progression Since Onset Gradually Improving   Edema   Edema Thumb;Distal Wrist Crease   Thumb (measured in cm)   P1 -  - Left 6   IP -  - Left 6.5   P1 -  - Right 7.3   IP -  - Right 7   Distal Wrist Crease (measured in cm)   Distal Wrist Crease - - Left 15.5   Distal Wrist Crease - - Right 16.5   Tenderness   Comment non tender with palpation   ROM   ROM AROM   AROM   AROM Thumb;Wrist   Thumb   MP Extension - Left 0   MP Extension - Right 0   MP Flexion - Left 45   MP Flexion - Right 15   IP Extension - Left 0   IP Extension - Right 0   IP Flexion - Left 70   IP Flexion - Right 25   Wrist   Wrist Extension - Left 70   Wrist Extension - Right 50   Wrist Flexion- Left 65   Wrist Flexion - Right 55   Sensation Findings   Sensation Findings Other (see comments);Sensation   Sensation Comments reports volar /dorsal thumb  numbness , not formally tested today   Strength   Strength Other (see comments)   Strength Comments deferred   Education Assessment   Preferred Learning Style Demonstration   Barriers to Learning Language   Therapy Interventions   Planned Therapy Interventions Ultrasound;Light Therapy;Fluidotherapy;Strengthening;ROM;Stretching;Manual Therapy;Edema Management;Self Care/Home Management;Home Program   Therapy plan comments To be added as appropriate   Clinical Impression   Criteria for Skilled Therapeutic Interventions Met yes;treatment indicated   OT Diagnosis Decreased ADL's IADL's   Influenced by the following impairments Pain;Edema;Decreased range of motion;Decreased strength;Impaired sensation   Assessment of Occupational Performance 3-5 Performance Deficits   Identified Performance Deficits opening things, writing, needle point   Clinical Decision Making (Complexity) Low complexity    Therapy Frequency 1x/week   Predicted Duration of Therapy Intervention (days/wks) 6 weeks   Risks and Benefits of Treatment have been explained. Yes   Patient, Family & other staff in agreement with plan of care Yes   Hand Eval Goals   Hand Eval Goals 1;2   Hand Goal 1   Goal Identifier needle point   Goal Description Patient increase IP flexion by 20 degrees so she can resume needle point   Target Date 09/17/20   Hand Goal 2   Goal Identifier pinch   Goal Description Patient to be able to pinch and open a zip lock style bag with min difficulty   Target Date 09/24/20   Supriya Hope OTR/L CHT  Occupational Therapist, Certified Hand Therapist

## 2020-09-10 NOTE — PROGRESS NOTES
ASSESSMENT & PLAN  Giorgio was seen today for fracture followup.    Diagnoses and all orders for this visit:    Closed displaced fracture of proximal phalanx of right thumb with routine healing, subsequent encounter  -     XR Finger Right G/E 2 Views; Future      Patient is a 64 year old female presenting for evaluation of   Chief Complaint   Patient presents with     Right Thumb - Fracture Followup     # Displaced right proximal thumb fracture: Noted after getting hit between a drill and a shed approx 7/1 with initial X-ray showing a comminuted mildly displaced, dorsally angulated proximal phalanx fracture.  Pt is now 2 mon out with pain resolved but still having stiffness notably over the DIP joint.  Repeat XR today showing increased callus formation over 1st proximal phalanx fx.  Counseled patient and son on nature of condition and treatment options.  Given this plan as below, follow-up as needed.  Pt and son understand and agree with plan.   Treatment: Transition brace, start ROM at home  Physical Therapy HEP given today  Medications  Limited NSAIDs/Tylenol    Concerning signs/sx that would warrant urgent evaluation were discussed.  All questions were answered, patient understands and agrees with plan.      Return if symptoms worsen or fail to improve.    -----    SUBJECTIVE  Mao CAMILLE Bryan is a/an 64 year old Right handed female who is seen in consultation at the request of  Leodan Pulliam M.D. for evaluation of right hand pain. The patient is seen with their son.    Onset: 2.5 week(s) ago. Patient describes injury as  Patient was helping with framing a shed and the drill fell on her thumb.   Location of Pain: right thumb-deep   Rating of Pain at worst: 7/10  Rating of Pain Currently: 5/10  Worsened by: movement, point tenderness   Better with: nothing   Treatments tried: ice, ibuprofen and splint   Associated symptoms: swelling, deep ache, bruising   Orthopedic history: NO  Relevant surgical history:  NO    Interim History - July 24, 2020  Since last visit on 7/14/2020 patient has improved pain well in the thumb spica cast.  No worsening of symptoms.  No interim injury.  Patient is here with her son  .  Native  helped complete the visit over the phone.    Interim History - August 10, 2020  Since last visit on 7/24/2020 patient has improved pain.  Patient is 6.5 weeks out from injury. No interim injury.   Native  helped complete the visit over the phone.    Interim History September 10, 2020  Giorgio Bryan is now 10.5 weeks out from injury.  Since last visit on 8/10/2020 patient minimal right thumb discomfort.  Able to wean from thumb spica brace.  She has completed one visit of OT.       History reviewed. No pertinent past medical history.  Social History     Socioeconomic History     Marital status:      Spouse name: None     Number of children: None     Years of education: None     Highest education level: None   Occupational History     None   Social Needs     Financial resource strain: None     Food insecurity     Worry: None     Inability: None     Transportation needs     Medical: None     Non-medical: None   Tobacco Use     Smoking status: Never Smoker     Smokeless tobacco: Never Used   Substance and Sexual Activity     Alcohol use: Never     Frequency: Never     Drug use: Never     Sexual activity: Not Currently   Lifestyle     Physical activity     Days per week: None     Minutes per session: None     Stress: None   Relationships     Social connections     Talks on phone: None     Gets together: None     Attends [employer] service: None     Active member of club or organization: None     Attends meetings of clubs or organizations: None     Relationship status: None     Intimate partner violence     Fear of current or ex partner: None     Emotionally abused: None     Physically abused: None     Forced sexual activity: None   Other Topics Concern     None  "  Social History Narrative     None     Patient's past medical, surgical, social, and family histories were reviewed today and no changes are noted.  No family history pertinent to the patient's problem today    REVIEW OF SYSTEMS:  10 point ROS is negative other than symptoms noted above in HPI, Past Medical History or as stated below  Constitutional: NEGATIVE for fever, chills, change in weight  Skin: NEGATIVE for worrisome rashes, moles or lesions  GI/: NEGATIVE for bowel or bladder changes  Neuro: NEGATIVE for weakness, dizziness or paresthesias    OBJECTIVE:  /79   Ht 1.575 m (5' 2\")   Wt 74.4 kg (164 lb)   BMI 30.00 kg/m     General: healthy, alert and in no distress  HEENT: no scleral icterus or conjunctival erythema  Skin: no suspicious lesions or rash. No jaundice.  CV: regular rhythm by palpation  Resp: normal respiratory effort without conversational dyspnea   Psych: normal mood and affect  Gait: normal steady gait with appropriate coordination and balance  Neuro: normal light touch sensory exam of the bilateral hands.    MSK:  RIGHT HAND  Inspection:  Swelling over 1st DIP joint stable appearing  Palpation: No TTP TTP over volar DIP joint otherwise no TTP over fx site      Range of Motion:  Very limited 1st DIP flex/extension 2/2 stiffness  Strength:  Intact thumb flexion and extension with no pain  Special Tests:    Positive: none    Negative: flexor digitorum superficialis testing, flexor digitorum profundus testing    Independent visualization of the below image:  Recent Results (from the past 24 hour(s))   XR Finger Right G/E 2 Views    Narrative    FINGER RIGHT TWO OR MORE VIEWS  9/11/2020 9:16 AM    HISTORY: Closed displaced fracture of proximal phalanx of right thumb  with routine healing, subsequent encounter.    COMPARISON: 8/10/2020    FINDINGS: There is a similar-appearing mildly displaced and moderately  volarly angulated fracture involving the mid shaft of the first  proximal " phalanx. Callus formation is again noted at the fracture  site.      Impression    IMPRESSION: No acute interval change compared to recent comparison.     I  ordered, visualized and reviewed these images with the patient  Stable appearing mildly displaced/angulated first proximal phalanx fracture similar appearance to previous x-ray with evidence of continued healing       Dhiraj Dsouza MD [facility]

## 2020-09-11 ENCOUNTER — OFFICE VISIT (OUTPATIENT)
Dept: ORTHOPEDICS | Facility: CLINIC | Age: 65
End: 2020-09-11
Payer: COMMERCIAL

## 2020-09-11 ENCOUNTER — ANCILLARY PROCEDURE (OUTPATIENT)
Dept: GENERAL RADIOLOGY | Facility: CLINIC | Age: 65
End: 2020-09-11
Attending: FAMILY MEDICINE
Payer: COMMERCIAL

## 2020-09-11 VITALS
WEIGHT: 164 LBS | BODY MASS INDEX: 30.18 KG/M2 | SYSTOLIC BLOOD PRESSURE: 133 MMHG | DIASTOLIC BLOOD PRESSURE: 79 MMHG | HEIGHT: 62 IN

## 2020-09-11 DIAGNOSIS — S62.511D CLOSED DISPLACED FRACTURE OF PROXIMAL PHALANX OF RIGHT THUMB WITH ROUTINE HEALING, SUBSEQUENT ENCOUNTER: Primary | ICD-10-CM

## 2020-09-11 DIAGNOSIS — S62.511D CLOSED DISPLACED FRACTURE OF PROXIMAL PHALANX OF RIGHT THUMB WITH ROUTINE HEALING, SUBSEQUENT ENCOUNTER: ICD-10-CM

## 2020-09-11 PROCEDURE — 73140 X-RAY EXAM OF FINGER(S): CPT | Mod: RT

## 2020-09-11 PROCEDURE — 99213 OFFICE O/P EST LOW 20 MIN: CPT | Performed by: FAMILY MEDICINE

## 2020-09-11 ASSESSMENT — MIFFLIN-ST. JEOR: SCORE: 1252.15

## 2020-09-11 NOTE — PATIENT INSTRUCTIONS
Diagnosis: Right thumb fracture  Image Findings: stable healing thumb fracture  Treatment: transition out of splint, home exercises  Medications: none  Follow-up: as needed, this will take months for the swelling and stiffness to improve.     Please call [phone]   Ask for my team if you have any questions or concerns    It was great seeing you again today!    Dhiraj Diaz [employer]

## 2020-09-11 NOTE — LETTER
9/11/2020         RE: Giorgio Bryan  [address]  [place] [place] [facility] [place] [address]        Dear Colleague,    Thank you for referring your patient, Giorgio Bryan, to the [facility]. Please see a copy of my visit note below.    ASSESSMENT & PLAN  Giorgio was seen today for fracture followup.    Diagnoses and all orders for this visit:    Closed displaced fracture of proximal phalanx of right thumb with routine healing, subsequent encounter  -     XR Finger Right G/E 2 Views; Future      Patient is a 64 year old female presenting for evaluation of   Chief Complaint   Patient presents with     Right Thumb - Fracture Followup     # Displaced right proximal thumb fracture: Noted after getting hit between a drill and a shed approx 7/1 with initial X-ray showing a comminuted mildly displaced, dorsally angulated proximal phalanx fracture.  Pt is now 2 mon out with pain resolved but still having stiffness notably over the DIP joint.  Repeat XR today showing increased callus formation over 1st proximal phalanx fx.  Counseled patient and son on nature of condition and treatment options.  Given this plan as below, follow-up as needed.  Pt and son understand and agree with plan.   Treatment: Transition brace, start ROM at home  Physical Therapy HEP given today  Medications  Limited NSAIDs/Tylenol    Concerning signs/sx that would warrant urgent evaluation were discussed.  All questions were answered, patient understands and agrees with plan.      Return if symptoms worsen or fail to improve.    -----    SUBJECTIVE  Giorgio Bryan is a/an 64 year old Right handed female who is seen in consultation at the request of  Leodan Pulliam M.D. for evaluation of right hand pain. The patient is seen with their son.    Onset: 2.5 week(s) ago. Patient describes injury as  Patient was helping with framing a shed and the drill fell on her thumb.   Location of Pain: right thumb-deep   Rating of Pain at worst:  7/10  Rating of Pain Currently: 5/10  Worsened by: movement, point tenderness   Better with: nothing   Treatments tried: ice, ibuprofen and splint   Associated symptoms: swelling, deep ache, bruising   Orthopedic history: NO  Relevant surgical history: NO    Interim History - July 24, 2020  Since last visit on 7/14/2020 patient has improved pain well in the thumb spica cast.  No worsening of symptoms.  No interim injury.  Patient is here with her son  .  Native  helped complete the visit over the phone.    Interim History - August 10, 2020  Since last visit on 7/24/2020 patient has improved pain.  Patient is 6.5 weeks out from injury. No interim injury.   Native  helped complete the visit over the phone.    Interim History September 10, 2020  Giorgio Bryan is now 10.5 weeks out from injury.  Since last visit on 8/10/2020 patient minimal right thumb discomfort.  Able to wean from thumb spica brace.  She has completed one visit of OT.       History reviewed. No pertinent past medical history.  Social History     Socioeconomic History     Marital status:      Spouse name: None     Number of children: None     Years of education: None     Highest education level: None   Occupational History     None   Social Needs     Financial resource strain: None     Food insecurity     Worry: None     Inability: None     Transportation needs     Medical: None     Non-medical: None   Tobacco Use     Smoking status: Never Smoker     Smokeless tobacco: Never Used   Substance and Sexual Activity     Alcohol use: Never     Frequency: Never     Drug use: Never     Sexual activity: Not Currently   Lifestyle     Physical activity     Days per week: None     Minutes per session: None     Stress: None   Relationships     Social connections     Talks on phone: None     Gets together: None     Attends [employer] service: None     Active member of club or organization: None     Attends meetings of clubs  "or organizations: None     Relationship status: None     Intimate partner violence     Fear of current or ex partner: None     Emotionally abused: None     Physically abused: None     Forced sexual activity: None   Other Topics Concern     None   Social History Narrative     None     Patient's past medical, surgical, social, and family histories were reviewed today and no changes are noted.  No family history pertinent to the patient's problem today    REVIEW OF SYSTEMS:  10 point ROS is negative other than symptoms noted above in HPI, Past Medical History or as stated below  Constitutional: NEGATIVE for fever, chills, change in weight  Skin: NEGATIVE for worrisome rashes, moles or lesions  GI/: NEGATIVE for bowel or bladder changes  Neuro: NEGATIVE for weakness, dizziness or paresthesias    OBJECTIVE:  /79   Ht 1.575 m (5' 2\")   Wt 74.4 kg (164 lb)   BMI 30.00 kg/m     General: healthy, alert and in no distress  HEENT: no scleral icterus or conjunctival erythema  Skin: no suspicious lesions or rash. No jaundice.  CV: regular rhythm by palpation  Resp: normal respiratory effort without conversational dyspnea   Psych: normal mood and affect  Gait: normal steady gait with appropriate coordination and balance  Neuro: normal light touch sensory exam of the bilateral hands.    MSK:  RIGHT HAND  Inspection:  Swelling over 1st DIP joint stable appearing  Palpation: No TTP TTP over volar DIP joint otherwise no TTP over fx site      Range of Motion:  Very limited 1st DIP flex/extension 2/2 stiffness  Strength:  Intact thumb flexion and extension with no pain  Special Tests:    Positive: none    Negative: flexor digitorum superficialis testing, flexor digitorum profundus testing    Independent visualization of the below image:  Recent Results (from the past 24 hour(s))   XR Finger Right G/E 2 Views    Narrative    FINGER RIGHT TWO OR MORE VIEWS  9/11/2020 9:16 AM    HISTORY: Closed displaced fracture of proximal " phalanx of right thumb  with routine healing, subsequent encounter.    COMPARISON: 8/10/2020    FINDINGS: There is a similar-appearing mildly displaced and moderately  volarly angulated fracture involving the mid shaft of the first  proximal phalanx. Callus formation is again noted at the fracture  site.      Impression    IMPRESSION: No acute interval change compared to recent comparison.     I  ordered, visualized and reviewed these images with the patient  Stable appearing mildly displaced/angulated first proximal phalanx fracture similar appearance to previous x-ray with evidence of continued healing       Dhiraj Dsouza MD [facility]    Again, thank you for allowing me to participate in the care of your patient.        Sincerely,        Dhiraj Dsouza MD

## 2021-02-08 NOTE — PROGRESS NOTES
08/17/20   Note: This is a copy of patient's last daily visit and will also serve as their Discharge Summary as they have not been in for further treatment 30 days past this date. Final assessment of goals and physical and functional status , therefore unavailable.     Signing Clinician's Name / Credentials   Signing clinician's name / credentials Supriya Hope OTR/L CHKYE   Session Number   Session Number 1   Authorization status 20 [employer]       Present Yes   Language Hmong   Intprepeter Comment used the ipad   Quick Add   Quick Add  Hand   Hand   Referring Physician Dr. Dsouza   Medical Diagnosis Closed displaced fracture of proximal phalanx of right thumb with routine healing   Orders Evaluate And Treat As Indicated   Progress/Recertification   Recertification Due 10/17/20   Adult OT Eval Goals   Hand Eval Goals 1;2   Hand Goal 1   Goal Identifier needle point   Goal Description Patient increase IP flexion by 20 degrees so she can resume needle point   Target Date 09/17/20   Hand Goal 2   Goal Identifier pinch   Goal Description Patient to be able to pinch and open a ziplock style bag with min difficulty   Target Date 09/24/20   Subjective Report   Subjective Report see eval   Initial Pain level 3/10   Objective Measures   Objective Measures Objective Measure 1   Objective Measure 1   Objective Measure see eval   Modalities   Modalities Fluidotherapy   Fluidotherapy   Fluidotherapy Minutes (12010) 15 Minutes   Skilled Intervention to enhance motion- decrease pain   Patient Response decreased soreness   Treatment Detail 115  with motion   Therapeutic Interventions   Therapeutic Interventions Therapeutic Procedure/Exercise;Manual Therapy   Therapeutic Procedure/Exercise   Therapeutic Procedure: strength, endurance, ROM, flexibillity minutes (74976) 8   Skilled Intervention to improve motion- verbal and physcial holding and cuing needed   Treatment Detail blocking, opposition and gentle  wrist ROM ex with written instructions for h ome   Manual Therapy   Manual Therapy Minutes (49709) 8   Skilled Intervention to decreased edema   Treatment Detail retro / myofacial style at thumb and wrist with Cocoa butter   Assessments Completed   Assessments Completed see eval   Equipment   Equipment gel tube   Education   Learner Patient   Readiness Eager   Method Booklet/handout;Explanation;Video   Response Verbalizes understanding;Demonstrates understanding   Education Notes see home program   Plan   Home program ex as om 8/17/note   Plan for next session check motion - continue with kyleex,man   Total Session Time